# Patient Record
Sex: FEMALE | Race: WHITE | Employment: UNEMPLOYED | URBAN - METROPOLITAN AREA
[De-identification: names, ages, dates, MRNs, and addresses within clinical notes are randomized per-mention and may not be internally consistent; named-entity substitution may affect disease eponyms.]

---

## 2023-01-13 ENCOUNTER — TELEPHONE (OUTPATIENT)
Dept: ORTHOPEDIC SURGERY | Age: 68
End: 2023-01-13

## 2023-01-13 NOTE — TELEPHONE ENCOUNTER
Patient is not required to e pre-medicated for 6 month cleaning due to her sx being 8 years ago. Patient understands & will call if dentist office has any trouble.

## 2023-09-15 ENCOUNTER — TELEPHONE (OUTPATIENT)
Age: 68
End: 2023-09-15

## 2023-09-15 NOTE — TELEPHONE ENCOUNTER
PT made direct appt on 10/12/23 for initial consult. 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 Wants to see if she can get an earlier appt.

## 2023-09-22 ENCOUNTER — OFFICE VISIT (OUTPATIENT)
Dept: ORTHOPEDIC SURGERY | Age: 68
End: 2023-09-22

## 2023-09-22 DIAGNOSIS — M79.672 LEFT FOOT PAIN: Primary | ICD-10-CM

## 2023-09-22 RX ORDER — LEVOTHYROXINE SODIUM 0.07 MG/1
75 TABLET ORAL DAILY
COMMUNITY
Start: 2023-09-08

## 2023-09-22 RX ORDER — DICLOFENAC POTASSIUM 50 MG/1
TABLET, FILM COATED ORAL
COMMUNITY
Start: 2023-08-10

## 2023-09-22 RX ORDER — TRIAMTERENE AND HYDROCHLOROTHIAZIDE 37.5; 25 MG/1; MG/1
1 TABLET ORAL DAILY
COMMUNITY
Start: 2023-09-08

## 2023-09-22 RX ORDER — MECLIZINE HYDROCHLORIDE 25 MG/1
25 TABLET ORAL 4 TIMES DAILY
COMMUNITY
Start: 2021-01-19

## 2023-10-12 ENCOUNTER — OFFICE VISIT (OUTPATIENT)
Age: 68
End: 2023-10-12
Payer: COMMERCIAL

## 2023-10-12 VITALS
HEART RATE: 76 BPM | SYSTOLIC BLOOD PRESSURE: 136 MMHG | BODY MASS INDEX: 34.49 KG/M2 | WEIGHT: 207 LBS | DIASTOLIC BLOOD PRESSURE: 72 MMHG | HEIGHT: 65 IN

## 2023-10-12 DIAGNOSIS — I10 PRIMARY HYPERTENSION: ICD-10-CM

## 2023-10-12 DIAGNOSIS — R00.2 PALPITATION: Primary | ICD-10-CM

## 2023-10-12 DIAGNOSIS — R07.9 CHEST PAIN, UNSPECIFIED TYPE: ICD-10-CM

## 2023-10-12 PROCEDURE — 3078F DIAST BP <80 MM HG: CPT | Performed by: INTERNAL MEDICINE

## 2023-10-12 PROCEDURE — 93000 ELECTROCARDIOGRAM COMPLETE: CPT | Performed by: INTERNAL MEDICINE

## 2023-10-12 PROCEDURE — 99203 OFFICE O/P NEW LOW 30 MIN: CPT | Performed by: INTERNAL MEDICINE

## 2023-10-12 PROCEDURE — 1123F ACP DISCUSS/DSCN MKR DOCD: CPT | Performed by: INTERNAL MEDICINE

## 2023-10-12 PROCEDURE — 3075F SYST BP GE 130 - 139MM HG: CPT | Performed by: INTERNAL MEDICINE

## 2023-10-12 ASSESSMENT — ENCOUNTER SYMPTOMS
BLURRED VISION: 0
HEMOPTYSIS: 0
VOMITING: 0
WHEEZING: 0
HOARSE VOICE: 0
ORTHOPNEA: 0
COLOR CHANGE: 0
BACK PAIN: 0
DIARRHEA: 0
HEMATEMESIS: 0
BOWEL INCONTINENCE: 0
ABDOMINAL PAIN: 0
HEMATOCHEZIA: 0
SHORTNESS OF BREATH: 0
SPUTUM PRODUCTION: 0
COUGH: 0
NAUSEA: 0

## 2023-10-12 NOTE — PROGRESS NOTES
[facility] CARDIOLOGY  [address] [facility], [address]  PHONE: [phone]        10/12/23    NAME:  Gill Jones  : 1955  MRN: [id number]       SUBJECTIVE:   Gill Jones is a 79 y.o. female seen for a consultation visit regarding the following: The patient is self referred for cardiac evaluation before elective colonoscopy. She reports no hx of previous CV disease. She admits to rare right sided chest pain associated with stress and occasional AVILA. Chief Complaint   Patient presents with    New Patient            HPI:  Consultation is requested by [unfilled] for evaluation of New Patient   . Chest Pain   This is a recurrent problem. The onset quality is gradual. The problem occurs rarely. The problem has been resolved. Pain location: right sided anterior chest. The pain is at a severity of 1/10. The pain is mild. The quality of the pain is described as heavy. The pain does not radiate. Pertinent negatives include no abdominal pain, back pain, claudication, cough, diaphoresis, dizziness, exertional chest pressure, fever, headaches, hemoptysis, irregular heartbeat, leg pain, lower extremity edema, malaise/fatigue, nausea, near-syncope, numbness, orthopnea, palpitations, PND, shortness of breath, sputum production, syncope, vomiting or weakness. The pain is aggravated by emotional upset. She has tried nothing for the symptoms. Past Medical History, Past Surgical History, Family history, Social History, and Medications were all reviewed with the patient today and updated as necessary.        Allergies   Allergen Reactions    Macadamia Nut Oil Other (See Comments)     Ears itching    Peanut-Containing Drug Products      macadamia    Psyllium Itching     Around mouth       Current Outpatient Medications:     levothyroxine (SYNTHROID) 75 MCG tablet, Take 1 tablet by mouth daily, Disp: , Rfl:     triamterene-hydroCHLOROthiazide (MAXZIDE-25) 37.5-25 MG per

## 2023-10-24 ENCOUNTER — TELEPHONE (OUTPATIENT)
Age: 68
End: 2023-10-24

## 2023-10-25 NOTE — TELEPHONE ENCOUNTER
Per Dr. Lynne Anaya, \"Stress echo instead of nuc if she can exercise on a treadmill\"    Called pt, she stated that she would like to call back to schedule tests once she decides what she wants to do. Follow up on 11/6 was cancelled.  Informed pt that if she has worsening chest pain to call back

## 2024-01-04 ENCOUNTER — OFFICE VISIT (OUTPATIENT)
Age: 69
End: 2024-01-04

## 2024-01-04 VITALS
HEART RATE: 68 BPM | BODY MASS INDEX: 34.66 KG/M2 | SYSTOLIC BLOOD PRESSURE: 128 MMHG | WEIGHT: 208 LBS | HEIGHT: 65 IN | DIASTOLIC BLOOD PRESSURE: 66 MMHG

## 2024-01-04 DIAGNOSIS — Z01.810 PREOP CARDIOVASCULAR EXAM: ICD-10-CM

## 2024-01-04 DIAGNOSIS — I10 PRIMARY HYPERTENSION: Primary | ICD-10-CM

## 2024-01-04 ASSESSMENT — ENCOUNTER SYMPTOMS
VOMITING: 0
HEMATOCHEZIA: 0
WHEEZING: 0
NAUSEA: 0
BOWEL INCONTINENCE: 0
HEMOPTYSIS: 0
COUGH: 0
ORTHOPNEA: 0
ABDOMINAL PAIN: 0
COLOR CHANGE: 0
SHORTNESS OF BREATH: 1
SPUTUM PRODUCTION: 0
BLURRED VISION: 0
BACK PAIN: 0
DIARRHEA: 0
HOARSE VOICE: 0
HEMATEMESIS: 0

## 2024-01-04 NOTE — PROGRESS NOTES
[facility] CARDIOLOGY  [address] [place] [address], [address]  [place] [address]  PHONE: [phone]        24        NAME:  Dinah Perdue  : 1955  MRN: [id number]       SUBJECTIVE:   Dinah Perdue is a 68 y.o. female seen for a follow up visit regarding the following: Recently,the patient was referred for cardiac evaluation before an elective colonoscopy.She reported rare atypical right sided chest pain and occasional AVILA.Follow up Lexiscan was normal  and c/w low CV risk.She returns to discuss test results.I discussed test results with the patient.    Chief Complaint   Patient presents with    Chest Pain     Nuke        HPI:    Chest Pain   The problem has been resolved. Associated symptoms include shortness of breath. Pertinent negatives include no abdominal pain, back pain, claudication, cough, diaphoresis, dizziness, exertional chest pressure, fever, headaches, hemoptysis, irregular heartbeat, leg pain, lower extremity edema, malaise/fatigue, nausea, near-syncope, numbness, orthopnea, palpitations, PND, sputum production, syncope, vomiting or weakness.       Past Medical History, Past Surgical History, Family history, Social History, and Medications were all reviewed with the patient today and updated as necessary.         Current Outpatient Medications:     levothyroxine (SYNTHROID) 75 MCG tablet, Take 1 tablet by mouth daily, Disp: , Rfl:     triamterene-hydroCHLOROthiazide (MAXZIDE-25) 37.5-25 MG per tablet, Take 1 tablet by mouth daily, Disp: , Rfl:     buPROPion (WELLBUTRIN SR) 150 MG extended release tablet, Take by mouth daily, Disp: , Rfl:     Sennosides 8.6 MG CAPS, Take by mouth, Disp: , Rfl:     diclofenac (CATAFLAM) 50 MG tablet, , Disp: , Rfl:     meclizine (ANTIVERT) 25 MG tablet, Take 1 tablet by mouth 4 times daily, Disp: , Rfl:     aspirin 325 MG tablet, Take 1 tablet by mouth daily, Disp: , Rfl:     mupirocin (BACTROBAN) 2 % nasal ointment, by Nasal route 2 times